# Patient Record
Sex: MALE | Race: BLACK OR AFRICAN AMERICAN | Employment: OTHER | URBAN - METROPOLITAN AREA
[De-identification: names, ages, dates, MRNs, and addresses within clinical notes are randomized per-mention and may not be internally consistent; named-entity substitution may affect disease eponyms.]

---

## 2024-05-17 ENCOUNTER — HOSPITAL ENCOUNTER (EMERGENCY)
Age: 69
Discharge: HOME | End: 2024-05-17
Attending: EMERGENCY MEDICINE
Payer: MEDICARE

## 2024-05-17 VITALS
OXYGEN SATURATION: 97 % | TEMPERATURE: 98.2 F | SYSTOLIC BLOOD PRESSURE: 146 MMHG | HEART RATE: 60 BPM | DIASTOLIC BLOOD PRESSURE: 76 MMHG | RESPIRATION RATE: 20 BRPM

## 2024-05-17 DIAGNOSIS — R31.0 GROSS HEMATURIA: Primary | ICD-10-CM

## 2024-05-17 PROCEDURE — 87086 URINE CULTURE/COLONY COUNT: CPT

## 2024-05-17 PROCEDURE — 99283 EMERGENCY DEPT VISIT LOW MDM: CPT

## 2024-05-17 ASSESSMENT — LIFESTYLE VARIABLES
HOW OFTEN DO YOU HAVE A DRINK CONTAINING ALCOHOL: NEVER
HOW MANY STANDARD DRINKS CONTAINING ALCOHOL DO YOU HAVE ON A TYPICAL DAY: PATIENT DOES NOT DRINK

## 2024-05-17 ASSESSMENT — PAIN - FUNCTIONAL ASSESSMENT: PAIN_FUNCTIONAL_ASSESSMENT: NONE - DENIES PAIN

## 2024-05-18 NOTE — ED NOTES
Patient prepared for and ready to be discharged. Dressed in clothes and given belongings.  Patient discharged at this time in no acute distress after pt verbalized understanding of discharge instructions.   Reviewed medications, and when to return to the ED with patient. Encouraged follow up with PCP  Patient wheeled to lobby, Family to take patient home.      Escuadra, Marylee, RN  05/17/24 [id number]

## 2024-05-18 NOTE — ED PROVIDER NOTES
ED Attending Attestation Note     Date of evaluation: 5/17/2024    This patient was seen by the resident.  I have seen and examined the patient, agree with the workup, evaluation, management and diagnosis. The care plan has been discussed.  My assessment reveals adult male with slight blood/pink urine in ruiz bag, placed 3 days ago.  Denies fever/ pain.  Abd soft, non-tender.       Jere Carranza MD  05/17/24 [id number]

## 2024-05-18 NOTE — DISCHARGE INSTRUCTIONS
You were seen in the emergency department for blood in your urine.     --Your symptoms are likely due to local irritation from your catheter.   --You did not require any treatment in the emergency department.   --A urine culture was sent and the results will be available when you follow up with Urology on Wednesday.  --Be sure to follow-up with Urology as previously scheduled.     Return to the emergency department immediately if you develop lower abdominal pain, worsening discomfort, low back pain, nausea with vomiting, fever over 100.4F, or any other new or concerning symptoms.

## 2024-05-18 NOTE — ED PROVIDER NOTES
present for >48hr and is likely already colonized.  No symptoms of acute cystitis at this time, therefore will defer empiric treatment.  Barry appears to be draining appropriately on presentation but thoroughly flushed by nursing and urine sample collected for culture, to be followed up in Urology clinic during appt on Wednesday (5/22).  Discussed discharge home w/ outpatient urology follow-up as previously scheduled, patient and his son at bedside are in agreement with this plan.  Strict return precautions for suprapubic pain, flank pain, fever, N/V, changes in mentation reviewed at bedside prior to discharge.  There is    Risks, benefits, and alternatives were discussed. At this time the patient has been deemed safe for discharge. Discharge instructions including strict return precautions for worsening or new symptoms have been communicated.    Medical Decision Making  Problems Addressed:  Gross hematuria: acute illness or injury    Amount and/or Complexity of Data Reviewed  Independent Historian: caregiver     Details: Son (primary caregiver) at bedside  External Data Reviewed: notes.     Details: Notes from 5/15 ED presentation reviewed for clinical context  Labs: ordered.    This patient was also evaluated by the attending physician. All care plans were discussed and agreed upon.    Clinical Impression     1. Gross hematuria      Disposition     PATIENT REFERRED TO:  No follow-up provider specified.    DISCHARGE MEDICATIONS:  Discharge Medication List as of 5/17/2024 11:55 PM        DISPOSITION Decision To Discharge 05/17/2024 11:26:22 PM    Diagnostic Results     RADIOLOGY:  No orders to display     LABS:   No results found for this visit on 05/17/24.    EKG   None performed    ED BEDSIDE ULTRASOUND:  No results found.    RECENT VITALS:  BP: (!) 146/76, Temp: 98.2 °F (36.8 °C), Pulse: 60,Respirations: 20, SpO2: 97 %     Procedures     None performed    ED Course     Nursing Notes, Past Medical Hx, Past  tenderness, left CVA tenderness, guarding or rebound.   Genitourinary:     Comments: Gross hematuria in Barry collection bag w/ small suspending blood clots/blood-tinged mucus in Barry tubing.  Uncircumcised penis w/o lesions. Mild erythema of urethral meatus c/w local irritation. No diffuse erythema or edema of glans. No erythema, lesions or crepitus of scrotum.   Musculoskeletal:         General: No swelling or tenderness.   Skin:     General: Skin is warm and dry.      Capillary Refill: Capillary refill takes less than 2 seconds.   Neurological:      Mental Status: He is alert. Mental status is at baseline.      Comments: Moderate dysarthria (baseline in setting of prior CVA)   Psychiatric:         Mood and Affect: Mood normal.         Behavior: Behavior normal.          Aileen Cohen MD  Resident  05/18/24 0001

## 2024-05-19 LAB — BACTERIA UR CULT: NORMAL
